# Patient Record
Sex: FEMALE | Race: OTHER | HISPANIC OR LATINO | URBAN - METROPOLITAN AREA
[De-identification: names, ages, dates, MRNs, and addresses within clinical notes are randomized per-mention and may not be internally consistent; named-entity substitution may affect disease eponyms.]

---

## 2020-10-29 ENCOUNTER — EMERGENCY (EMERGENCY)
Facility: HOSPITAL | Age: 50
LOS: 0 days | Discharge: HOME | End: 2020-10-29
Attending: EMERGENCY MEDICINE | Admitting: EMERGENCY MEDICINE
Payer: SELF-PAY

## 2020-10-29 VITALS
HEART RATE: 71 BPM | SYSTOLIC BLOOD PRESSURE: 131 MMHG | DIASTOLIC BLOOD PRESSURE: 63 MMHG | TEMPERATURE: 99 F | OXYGEN SATURATION: 96 % | RESPIRATION RATE: 18 BRPM

## 2020-10-29 DIAGNOSIS — Z98.51 TUBAL LIGATION STATUS: ICD-10-CM

## 2020-10-29 DIAGNOSIS — M79.601 PAIN IN RIGHT ARM: ICD-10-CM

## 2020-10-29 DIAGNOSIS — R07.81 PLEURODYNIA: ICD-10-CM

## 2020-10-29 DIAGNOSIS — Z98.51 TUBAL LIGATION STATUS: Chronic | ICD-10-CM

## 2020-10-29 LAB
ALBUMIN SERPL ELPH-MCNC: 4.6 G/DL — SIGNIFICANT CHANGE UP (ref 3.5–5.2)
ALP SERPL-CCNC: 102 U/L — SIGNIFICANT CHANGE UP (ref 30–115)
ALT FLD-CCNC: 18 U/L — SIGNIFICANT CHANGE UP (ref 0–41)
ANION GAP SERPL CALC-SCNC: 12 MMOL/L — SIGNIFICANT CHANGE UP (ref 7–14)
AST SERPL-CCNC: 22 U/L — SIGNIFICANT CHANGE UP (ref 0–41)
BASOPHILS # BLD AUTO: 0.04 K/UL — SIGNIFICANT CHANGE UP (ref 0–0.2)
BASOPHILS NFR BLD AUTO: 0.6 % — SIGNIFICANT CHANGE UP (ref 0–1)
BILIRUB SERPL-MCNC: 0.3 MG/DL — SIGNIFICANT CHANGE UP (ref 0.2–1.2)
BUN SERPL-MCNC: 12 MG/DL — SIGNIFICANT CHANGE UP (ref 10–20)
CALCIUM SERPL-MCNC: 9.4 MG/DL — SIGNIFICANT CHANGE UP (ref 8.5–10.1)
CHLORIDE SERPL-SCNC: 104 MMOL/L — SIGNIFICANT CHANGE UP (ref 98–110)
CO2 SERPL-SCNC: 24 MMOL/L — SIGNIFICANT CHANGE UP (ref 17–32)
CREAT SERPL-MCNC: 0.6 MG/DL — LOW (ref 0.7–1.5)
EOSINOPHIL # BLD AUTO: 0.09 K/UL — SIGNIFICANT CHANGE UP (ref 0–0.7)
EOSINOPHIL NFR BLD AUTO: 1.4 % — SIGNIFICANT CHANGE UP (ref 0–8)
GLUCOSE SERPL-MCNC: 97 MG/DL — SIGNIFICANT CHANGE UP (ref 70–99)
HCT VFR BLD CALC: 38.6 % — SIGNIFICANT CHANGE UP (ref 37–47)
HGB BLD-MCNC: 12.8 G/DL — SIGNIFICANT CHANGE UP (ref 12–16)
IMM GRANULOCYTES NFR BLD AUTO: 0.2 % — SIGNIFICANT CHANGE UP (ref 0.1–0.3)
LIDOCAIN IGE QN: 46 U/L — SIGNIFICANT CHANGE UP (ref 7–60)
LYMPHOCYTES # BLD AUTO: 2.61 K/UL — SIGNIFICANT CHANGE UP (ref 1.2–3.4)
LYMPHOCYTES # BLD AUTO: 40.8 % — SIGNIFICANT CHANGE UP (ref 20.5–51.1)
MAGNESIUM SERPL-MCNC: 2.2 MG/DL — SIGNIFICANT CHANGE UP (ref 1.8–2.4)
MCHC RBC-ENTMCNC: 27.5 PG — SIGNIFICANT CHANGE UP (ref 27–31)
MCHC RBC-ENTMCNC: 33.2 G/DL — SIGNIFICANT CHANGE UP (ref 32–37)
MCV RBC AUTO: 83 FL — SIGNIFICANT CHANGE UP (ref 81–99)
MONOCYTES # BLD AUTO: 0.41 K/UL — SIGNIFICANT CHANGE UP (ref 0.1–0.6)
MONOCYTES NFR BLD AUTO: 6.4 % — SIGNIFICANT CHANGE UP (ref 1.7–9.3)
NEUTROPHILS # BLD AUTO: 3.23 K/UL — SIGNIFICANT CHANGE UP (ref 1.4–6.5)
NEUTROPHILS NFR BLD AUTO: 50.6 % — SIGNIFICANT CHANGE UP (ref 42.2–75.2)
NRBC # BLD: 0 /100 WBCS — SIGNIFICANT CHANGE UP (ref 0–0)
PLATELET # BLD AUTO: 226 K/UL — SIGNIFICANT CHANGE UP (ref 130–400)
POTASSIUM SERPL-MCNC: 3.9 MMOL/L — SIGNIFICANT CHANGE UP (ref 3.5–5)
POTASSIUM SERPL-SCNC: 3.9 MMOL/L — SIGNIFICANT CHANGE UP (ref 3.5–5)
PROT SERPL-MCNC: 6.9 G/DL — SIGNIFICANT CHANGE UP (ref 6–8)
RBC # BLD: 4.65 M/UL — SIGNIFICANT CHANGE UP (ref 4.2–5.4)
RBC # FLD: 12.9 % — SIGNIFICANT CHANGE UP (ref 11.5–14.5)
SODIUM SERPL-SCNC: 140 MMOL/L — SIGNIFICANT CHANGE UP (ref 135–146)
TROPONIN T SERPL-MCNC: <0.01 NG/ML — SIGNIFICANT CHANGE UP
WBC # BLD: 6.39 K/UL — SIGNIFICANT CHANGE UP (ref 4.8–10.8)
WBC # FLD AUTO: 6.39 K/UL — SIGNIFICANT CHANGE UP (ref 4.8–10.8)

## 2020-10-29 PROCEDURE — 99285 EMERGENCY DEPT VISIT HI MDM: CPT

## 2020-10-29 PROCEDURE — 93010 ELECTROCARDIOGRAM REPORT: CPT

## 2020-10-29 PROCEDURE — 71046 X-RAY EXAM CHEST 2 VIEWS: CPT | Mod: 26

## 2020-10-29 NOTE — ED PROVIDER NOTE - NSFOLLOWUPCLINICS_GEN_ALL_ED_FT
[facility] Cardiology [address] [address]  Phone: [phone]  Fax:   Follow Up Time: 1-3 Days

## 2020-10-29 NOTE — ED PROVIDER NOTE - PATIENT PORTAL LINK FT
You can access the FollowMyHealth Patient Portal offered by [facility] by registering at the following website: [email or web address]. By joining [employer]’s FollowMyHealth portal, you will also be able to view your health information using other applications (apps) compatible with our system.

## 2020-10-29 NOTE — ED PROVIDER NOTE - NS ED ROS FT
CONST: No fever, chills or bodyaches  EYES: No pain, redness, drainage or visual changes.  ENT: No ear pain or discharge, nasal discharge or congestion. No sore throat  CARD: No chest pain, palpitations  RESP: (+) left rib pain No SOB, cough, hemoptysis. No hx of asthma or COPD  GI: No abdominal pain, N/V/D  : No urinary symptoms  MS: No joint pain, back pain. (+) right arm pain.   SKIN: No rashes  NEURO: No headache, dizziness, paresthesias or LOC

## 2020-10-29 NOTE — ED PROVIDER NOTE - OBJECTIVE STATEMENT
51 y/o female with no significant PMH presents to the ED for evaluation of sudden onset right arm pain, and left rib pain about an hour ago while taking out the trash. pt reports the pain has resolved. pt denies similar episodes, stress test, sob, back pain, neck pain, jaw pain, dizziness, sweating, abdominal pain, n/v/d/c, fever, chills, cough, hx of mi or blood clot, fhx of sudden cardiac death, cigarette smoking, illicit drug use, use of alcohol, recent travel, recent trauma, use of steroids or hormones, recent surgeries, or syncope.

## 2020-10-29 NOTE — ED PROVIDER NOTE - CARE PROVIDER_API CALL
Zheng Johansen)  Cardiology; Internal Medicine; Nuclear Cardiology  [address] [place] [address]  Phone: [phone]  Fax: [phone]  Follow Up Time:

## 2020-10-29 NOTE — ED PROVIDER NOTE - CLINICAL SUMMARY MEDICAL DECISION MAKING FREE TEXT BOX
Labs WNL, CXR negative, patient refused further CP work up. left AMA.  She has capacity, is A&Ox3, her  participated in the decision making process.  Strict return precautions given.

## 2020-10-29 NOTE — ED PROVIDER NOTE - PROGRESS NOTE DETAILS
Labs WNL, CXR negative, patient refused further CP work up. left AMA.  She has capacity, is A&Ox3, her  participated in the decision making process.  Strict return precautions given. Patient to be discharged from ED. Any available test results were discussed with patient and/or family. Verbal instructions given, including instructions to return to ED immediately for any new, worsening, or concerning symptoms. Patient endorsed understanding. Written discharge instructions additionally given, including follow-up plan.  Patient was given opportunity to ask questions.

## 2020-10-29 NOTE — ED PROVIDER NOTE - ATTENDING CONTRIBUTION TO CARE
49 yo female without any significant PMH c/o acute onset of rt arm pain and then sharp left sided lower chest pain at rest while at home.  Pain was non-radiating, not associated with any dizziness, lightheadedness, SOB, CP, N/V/D, no focal weakness or paresthesias. no abdominal or back pain, no leg pain or swelling, no change in exercise tolerance,  No tobacco or drug use, no family h/o early CAD or sudden cardiac death, no PE. LMP 2 years ago, patient also has a h/o tubal ligation.  Patient denies any recent illness, travel or sick contacts.  Patient is pain free at present, no other complaints.  Well-appearing female, NAD, PERRL, pink conjunctivae, nml work of breathing, lungs CTA b/l, speaking full sentences, RRR, well-perfused extremities, distal pulses intact, abdomen soft, without rebound or guarding, no midline spine or CVA ttp, no leg edema or calf ttp, A&Ox3, no focal neuro deficits.  Plan: ECG, CXR, labs, barrie further CP work up in [facility]. At this time patient does not want to stay for more cardiac work up, will reassess later.

## 2020-10-29 NOTE — ED PROVIDER NOTE - PHYSICAL EXAMINATION
Physical Exam    Vital Signs: I have reviewed the initial vital signs.  Constitutional: well-nourished, appears stated age, no acute distress  Eyes: Conjunctiva pink, Sclera clear  Cardiovascular: S1 and S2, regular rate, regular rhythm, well-perfused extremities, radial pulses equal and 2+ b/l.   Respiratory: unlabored respiratory effort, clear to auscultation bilaterally no wheezing, rales and rhonchi. pt is speaking full sentences. no accessory muscle use. no reproducible chest tenderness or chest wall crepitus.   Gastrointestinal: soft, non-tender, nondistended abdomen, no pulsatile mass, normal bowl sounds, no rebound, no guarding  Musculoskeletal: supple neck, no lower extremity edema, no calf tenderness  Integumentary: warm, dry, no rash  Neurologic: awake, alert  Psychiatric: appropriate mood, appropriate affect

## 2021-02-18 ENCOUNTER — EMERGENCY (EMERGENCY)
Facility: HOSPITAL | Age: 51
LOS: 0 days | Discharge: HOME | End: 2021-02-18
Attending: STUDENT IN AN ORGANIZED HEALTH CARE EDUCATION/TRAINING PROGRAM | Admitting: STUDENT IN AN ORGANIZED HEALTH CARE EDUCATION/TRAINING PROGRAM
Payer: SELF-PAY

## 2021-02-18 VITALS
TEMPERATURE: 98 F | WEIGHT: 139.99 LBS | OXYGEN SATURATION: 99 % | HEART RATE: 94 BPM | RESPIRATION RATE: 18 BRPM | DIASTOLIC BLOOD PRESSURE: 64 MMHG | SYSTOLIC BLOOD PRESSURE: 139 MMHG

## 2021-02-18 DIAGNOSIS — S52.531A COLLES' FRACTURE OF RIGHT RADIUS, INITIAL ENCOUNTER FOR CLOSED FRACTURE: ICD-10-CM
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
## 2021-02-18 DIAGNOSIS — Z98.51 TUBAL LIGATION STATUS: Chronic | ICD-10-CM

## 2021-02-18 DIAGNOSIS — S52.611A DISPLACED FRACTURE OF RIGHT ULNA STYLOID PROCESS, INITIAL ENCOUNTER FOR CLOSED FRACTURE: ICD-10-CM

## 2021-02-18 PROBLEM — M19.90 UNSPECIFIED OSTEOARTHRITIS, UNSPECIFIED SITE: Chronic | Status: ACTIVE | Noted: 2020-10-29

## 2021-02-18 PROCEDURE — 25605 CLTX DST RDL FX/EPHYS SEP W/: CPT | Mod: 54

## 2021-02-18 PROCEDURE — 73130 X-RAY EXAM OF HAND: CPT | Mod: 26,RT

## 2021-02-18 PROCEDURE — 99284 EMERGENCY DEPT VISIT MOD MDM: CPT | Mod: 57

## 2021-02-18 PROCEDURE — 73110 X-RAY EXAM OF WRIST: CPT | Mod: 26,RT

## 2021-02-18 PROCEDURE — 73090 X-RAY EXAM OF FOREARM: CPT | Mod: 26,RT

## 2021-02-18 PROCEDURE — 73080 X-RAY EXAM OF ELBOW: CPT | Mod: 26,RT

## 2021-02-18 RX ORDER — OXYCODONE AND ACETAMINOPHEN 5; 325 MG/1; MG/1
1 TABLET ORAL ONCE
Refills: 0 | Status: DISCONTINUED | OUTPATIENT
Start: 2021-02-18 | End: 2021-02-18

## 2021-02-18 RX ORDER — IBUPROFEN 200 MG
600 TABLET ORAL ONCE
Refills: 0 | Status: COMPLETED | OUTPATIENT
Start: 2021-02-18 | End: 2021-02-18

## 2021-02-18 RX ADMIN — Medication 600 MILLIGRAM(S): at 10:08

## 2021-02-18 NOTE — ED PROCEDURE NOTE - NS ED PERI VASCULAR NEG
fingers/toes warm to touch/no paresthesia/no swelling/no cyanosis of extremity/capillary refill time < 2 seconds
fingers/toes warm to touch/no paresthesia/no swelling/no cyanosis of extremity/capillary refill time < 2 seconds

## 2021-02-18 NOTE — ED PROVIDER NOTE - PATIENT PORTAL LINK FT
You can access the FollowMyHealth Patient Portal offered by [facility] by registering at the following website: [email or web address]. By joining [employer]’s FollowMyHealth portal, you will also be able to view your health information using other applications (apps) compatible with our system.

## 2021-02-18 NOTE — ED ADULT TRIAGE NOTE - CHIEF COMPLAINT QUOTE
pt fell on ice right wirst swelling no loc no blood thinner pt fell on ice right wirst swelling no loc no blood thinner Contact # Leticia (daughter) [phone]

## 2021-02-18 NOTE — ED PROVIDER NOTE - WET READ LAUNCH FT
There are 4 Wet Read(s) to document. There are 5 Wet Read(s) to document. There are no Wet Read(s) to document.

## 2021-02-18 NOTE — ED PROVIDER NOTE - CLINICAL SUMMARY MEDICAL DECISION MAKING FREE TEXT BOX
50 year old female presents here c/o right wrist pain after sustaining a mechanical fall onto ice. Patient endorses falling on an outstretched hand. No other injuries. No numbness/tingling to the extremity.  Vs reviewed. Pain medication provided. Xrays obtained demonstrating colles fracture. Patient was given a hematoma block and fracture reduced and placed in splint . Patient a spoken to in detail about results  All questions addressed. Patient has proper follow up. Return precautions given. Patient to follow up with Orthopedics.

## 2021-02-18 NOTE — ED PROVIDER NOTE - CARE PLAN
Principal Discharge DX:	Radius distal fracture   Principal Discharge DX:	Colles' fracture of right radius   Principal Discharge DX:	Colles' fracture of right radius  Secondary Diagnosis:	Ulna styloid fracture, closed

## 2021-02-18 NOTE — ED PROVIDER NOTE - CARE PROVIDER_API CALL
Bryce Sanchez)  Orthopaedic Surgery  [address] [place] [address]  Phone: [phone]  Fax: [phone]  Follow Up Time: 4-6 Days

## 2021-02-18 NOTE — ED PROVIDER NOTE - NSFOLLOWUPCLINICS_GEN_ALL_ED_FT
[facility]  Orthpedic  [address], [place]   Phone: [phone]  Fax:   Follow Up Time: 4-6 Days

## 2021-02-18 NOTE — ED PROCEDURE NOTE - CPROC ED POST PROC CARE GUIDE1
Verbal/written post procedure instructions were given to patient/caregiver./Instructed patient/caregiver to follow-up with primary care physician./Elevate the injured extremity as instructed./Keep the cast/splint/dressing clean and dry.
Verbal/written post procedure instructions were given to patient/caregiver./Instructed patient/caregiver to follow-up with primary care physician./Instructed patient/caregiver regarding signs and symptoms of infection./Keep the cast/splint/dressing clean and dry.
Verbal/written post procedure instructions were given to patient/caregiver./Instructed patient/caregiver to follow-up with primary care physician./Keep the cast/splint/dressing clean and dry.

## 2021-02-18 NOTE — ED PROVIDER NOTE - ATTENDING CONTRIBUTION TO CARE
I personally evaluated the patient. I reviewed the Resident’s or Physician Assistant’s note (as assigned above), and agree with the findings and plan except as documented in my note.  50 year old female presents here c/o right wrist pain after sustaining a mechanical fall onto ice. Patient endorses falling on an outstretched hand. No other injuries. No numbness/tingling to the extremity.   On exam  CONSTITUTIONAL: WA / WN / NAD  HEAD: NCAT  EYES: PERRL; EOMI;   ENT: Normal pharynx; mucous membranes pink/moist, no erythema.  NECK: Supple;   MSK/EXT: + deformity to right hand with swelling & ttp distal radius. No ttp forearm elbow humerus. Pulses in tact. sensation in tact.  SKIN: Warm and dry;   NEURO: AAOx3  PSYCH: Memory Intact, Normal Affect

## 2021-02-18 NOTE — ED PROVIDER NOTE - OBJECTIVE STATEMENT
49 yo F no known pmhx presenting to the ED c/o constant, severe, throbbing, localized, right wrist pain and swelling after mechanical trip and fall on ice this am while walking outside, fell on outstretched extended wrist. Denies any alleviating factors, aggravated with movement and palpation. Denies head trauma, LOC, bruising, numbness/tingling, cyanosis, dizziness, sob, chest pain, nausea, vomiting.

## 2021-02-18 NOTE — ED PROVIDER NOTE - PHYSICAL EXAMINATION
GENERAL: Well-nourished, Well-developed. NAD.  HEAD: Normocephalic, atraumatic  Eyes: PERRLA, EOMI. No asymmetry. No nystagmus. No conjunctival injection. Non-icteric sclera.  Neck: Supple. No cervical midline TTP. No paravertebral TTP to traps. FROM  CVS: RRR  MSK: (+)TTP R wrist with swelling and dorsal displacement of distal radius. No ecchymosis. LROM due to pain. FROM of R elbow and R hand/fingers, nontender.   Skin: Warm, Dry. No rashes or lesions. Good cap refill < 2 sec B/L.  EXT: Radial pulses present B/L.   Neuro: NVI

## 2021-02-18 NOTE — ED PROVIDER NOTE - NSFOLLOWUPINSTRUCTIONS_ED_ALL_ED_FT
**Follow up with Dr. Sanchez within 1 week** Take Tylenol/Motrin as needed for pain.     Fracture    A fracture is a break in one of your bones. This can occur from a variety of injuries, especially traumatic ones. Symptoms include pain, bruising, or swelling. Do not use the injured limb. If a fracture is in one of the bones below your waist, do not put weight on that limb unless instructed to do so by your healthcare provider. Crutches or a cane may have been provided. A splint or cast may have been applied by your health care provider. Make sure to keep it dry and follow up with an orthopedist as instructed.    SEEK IMMEDIATE MEDICAL CARE IF YOU HAVE ANY OF THE FOLLOWING SYMPTOMS: numbness, tingling, increasing pain, or weakness in any part of the injured limb.

## 2021-02-18 NOTE — ED ADULT NURSE NOTE - CHIEF COMPLAINT QUOTE
pt fell on ice right wirst swelling no loc no blood thinner Contact # Leticia (daughter) [phone]

## 2021-02-18 NOTE — ED PROVIDER NOTE - NS ED ROS FT
Constitutional: (-) fever (-) malaise (-) diaphoresis (-) chills   Eyes: (-) visual changes  Neck: (-) neck pain (-) neck stiffness  Cardiac: (-) chest pain  (-) palpitations (-) syncope   Respiratory: (-) SOB (-) GARRETT  GI: (-) nausea (-) vomiting   MS: (-) back pain   Neuro: (-) headache (-) dizziness (-) numbness/tingling to extremities B/L (-) weakness  Skin: (-) rash (-) laceration    Except as documented in the HPI, all other systems are negative. Constitutional: (-) fever (-) malaise (-) diaphoresis (-) chills   Eyes: (-) visual changes  Neck: (-) neck pain (-) neck stiffness  Cardiac: (-) chest pain  (-) palpitations (-) syncope   Respiratory: (-) SOB (-) GARRETT  GI: (-) nausea (-) vomiting   MS: (-) back pain , + wrist pain  Neuro: (-) headache (-) dizziness (-) numbness/tingling to extremities B/L (-) weakness  Skin: (-) rash (-) laceration    Except as documented in the HPI, all other systems are negative.

## 2021-02-18 NOTE — ED PROVIDER NOTE - PROGRESS NOTE DETAILS
ISMAEL Ricks: I was directly involved in the management of this patient. Case was discussed with PA Tae Vitale Discussed results with pt.  All questions were answered and return precautions discussed.  Pt is asx and comfortable at this time.  Unremarkable re-exam.  No further concerns at this time from pt.  Will follow up with PMD and ortho.   used to discuss splint care, f/u and s/s that warrant return to the ED.   Pt understands and agrees with tx plan. Called pt to discuss radiographic findings of ulnar styloid fracture, pt aware, will follow up outpt ortho.

## 2021-02-18 NOTE — ED PROCEDURE NOTE - CPROC ED POST RADIOGRAPHY1
post-procedure radiography performed/extremity correctly positioned, splinted
post-procedure radiography performed/fracture reduced, correctly positioned

## 2021-02-18 NOTE — ED ADULT NURSE NOTE - NSIMPLEMENTINTERV_GEN_ALL_ED
Implemented All Fall Risk Interventions:  [place] to call system. Call bell, personal items and telephone within reach. Instruct patient to call for assistance. Room bathroom lighting operational. Non-slip footwear when patient is off stretcher. Physically safe environment: no spills, clutter or unnecessary equipment. Stretcher in lowest position, wheels locked, appropriate side rails in place. Provide visual cue, wrist band, yellow gown, etc. Monitor gait and stability. Monitor for mental status changes and reorient to person, place, and time. Review medications for side effects contributing to fall risk. Reinforce activity limits and safety measures with patient and family.